# Patient Record
Sex: FEMALE | Race: WHITE | Employment: FULL TIME | URBAN - METROPOLITAN AREA
[De-identification: names, ages, dates, MRNs, and addresses within clinical notes are randomized per-mention and may not be internally consistent; named-entity substitution may affect disease eponyms.]

---

## 2018-11-07 RX ORDER — CEFAZOLIN SODIUM/WATER 2 G/20 ML
2 SYRINGE (ML) INTRAVENOUS ONCE
Status: CANCELLED | OUTPATIENT
Start: 2018-11-07 | End: 2018-11-07

## 2018-11-07 RX ORDER — SODIUM CHLORIDE 0.9 % (FLUSH) 0.9 %
5-10 SYRINGE (ML) INJECTION EVERY 8 HOURS
Status: CANCELLED | OUTPATIENT
Start: 2018-11-07

## 2018-11-07 RX ORDER — SODIUM CHLORIDE 0.9 % (FLUSH) 0.9 %
5-10 SYRINGE (ML) INJECTION AS NEEDED
Status: CANCELLED | OUTPATIENT
Start: 2018-11-07

## 2018-11-08 ENCOUNTER — HOSPITAL ENCOUNTER (OUTPATIENT)
Dept: SURGERY | Age: 48
Discharge: HOME | End: 2018-11-08
Payer: COMMERCIAL

## 2018-11-08 VITALS
DIASTOLIC BLOOD PRESSURE: 80 MMHG | SYSTOLIC BLOOD PRESSURE: 186 MMHG | RESPIRATION RATE: 16 BRPM | HEIGHT: 66 IN | WEIGHT: 293 LBS | TEMPERATURE: 98.3 F | BODY MASS INDEX: 47.09 KG/M2 | HEART RATE: 74 BPM | OXYGEN SATURATION: 93 %

## 2018-11-08 LAB
ALBUMIN SERPL-MCNC: 3.7 G/DL (ref 3.5–5)
ALBUMIN/GLOB SERPL: 1 {RATIO} (ref 1.2–3.5)
ALP SERPL-CCNC: 69 U/L (ref 50–136)
ALT SERPL-CCNC: 29 U/L (ref 12–65)
ANION GAP SERPL CALC-SCNC: 6 MMOL/L (ref 7–16)
AST SERPL-CCNC: 13 U/L (ref 15–37)
BILIRUB SERPL-MCNC: 0.6 MG/DL (ref 0.2–1.1)
BUN SERPL-MCNC: 16 MG/DL (ref 6–23)
CALCIUM SERPL-MCNC: 9 MG/DL (ref 8.3–10.4)
CHLORIDE SERPL-SCNC: 105 MMOL/L (ref 98–107)
CO2 SERPL-SCNC: 29 MMOL/L (ref 21–32)
CREAT SERPL-MCNC: 0.95 MG/DL (ref 0.6–1)
ERYTHROCYTE [DISTWIDTH] IN BLOOD BY AUTOMATED COUNT: 12.9 %
GLOBULIN SER CALC-MCNC: 3.7 G/DL (ref 2.3–3.5)
GLUCOSE SERPL-MCNC: 108 MG/DL (ref 65–100)
HCT VFR BLD AUTO: 51.6 % (ref 35.8–46.3)
HGB BLD-MCNC: 16.7 G/DL (ref 11.7–15.4)
MCH RBC QN AUTO: 31.2 PG (ref 26.1–32.9)
MCHC RBC AUTO-ENTMCNC: 32.4 G/DL (ref 31.4–35)
MCV RBC AUTO: 96.4 FL (ref 79.6–97.8)
NRBC # BLD: 0 K/UL (ref 0–0.2)
PLATELET # BLD AUTO: 221 K/UL (ref 150–450)
PMV BLD AUTO: 11.2 FL (ref 9.4–12.3)
POTASSIUM SERPL-SCNC: 3.9 MMOL/L (ref 3.5–5.1)
PROT SERPL-MCNC: 7.4 G/DL (ref 6.3–8.2)
RBC # BLD AUTO: 5.35 M/UL (ref 4.05–5.2)
SODIUM SERPL-SCNC: 140 MMOL/L (ref 136–145)
WBC # BLD AUTO: 6.6 K/UL (ref 4.3–11.1)

## 2018-11-08 PROCEDURE — 85027 COMPLETE CBC AUTOMATED: CPT

## 2018-11-08 PROCEDURE — 80053 COMPREHEN METABOLIC PANEL: CPT

## 2018-11-08 RX ORDER — LOSARTAN POTASSIUM 100 MG/1
100 TABLET ORAL DAILY
COMMUNITY

## 2018-11-08 NOTE — PERIOP NOTES
Recent Results (from the past 24 hour(s)) CBC W/O DIFF Collection Time: 11/08/18  9:28 AM  
Result Value Ref Range WBC 6.6 4.3 - 11.1 K/uL  
 RBC 5.35 (H) 4.05 - 5.2 M/uL  
 HGB 16.7 (H) 11.7 - 15.4 g/dL HCT 51.6 (H) 35.8 - 46.3 % MCV 96.4 79.6 - 97.8 FL  
 MCH 31.2 26.1 - 32.9 PG  
 MCHC 32.4 31.4 - 35.0 g/dL  
 RDW 12.9 % PLATELET 650 134 - 072 K/uL MPV 11.2 9.4 - 12.3 FL ABSOLUTE NRBC 0.00 0.0 - 0.2 K/uL METABOLIC PANEL, COMPREHENSIVE Collection Time: 11/08/18  9:28 AM  
Result Value Ref Range Sodium 140 136 - 145 mmol/L Potassium 3.9 3.5 - 5.1 mmol/L Chloride 105 98 - 107 mmol/L  
 CO2 29 21 - 32 mmol/L Anion gap 6 (L) 7 - 16 mmol/L Glucose 108 (H) 65 - 100 mg/dL BUN 16 6 - 23 MG/DL Creatinine 0.95 0.6 - 1.0 MG/DL  
 GFR est AA >60 >60 ml/min/1.73m2 GFR est non-AA >60 >60 ml/min/1.73m2 Calcium 9.0 8.3 - 10.4 MG/DL Bilirubin, total 0.6 0.2 - 1.1 MG/DL  
 ALT (SGPT) 29 12 - 65 U/L  
 AST (SGOT) 13 (L) 15 - 37 U/L Alk. phosphatase 69 50 - 136 U/L Protein, total 7.4 6.3 - 8.2 g/dL Albumin 3.7 3.5 - 5.0 g/dL Globulin 3.7 (H) 2.3 - 3.5 g/dL A-G Ratio 1.0 (L) 1.2 - 3.5

## 2018-11-08 NOTE — PERIOP NOTES
Patient confirms name and . Order to obtain consent not found in EHR. Paper orders on chart  matches case posting. Type 1B surgery,  assessment complete. Labs per surgeon: Meera Mojica 18 Labs per anesthesia protocol: none EKG: not required Hibiclens and instructions given per hospital policy. Patient provided with and instructed on educational handouts including Guide to Surgery, Pain Management, Hand Hygiene, Blood Transfusion Education, and [facility]chure. Patient answered medical/surgical history questions at their best of ability. All prior to admission medications documented in [facility]. Patient instructed to continue previous medications as prescribed prior to surgery and to take the following medications the day of surgery according to anesthesia guidelines with a small sip of water: none. Ana Elayne Patient instructed to hold all vitamins 7 days prior to surgery and NSAIDS 5 days prior to surgery, patient verbalized understanding. Medications to be held: none. Patient instructed on the following: 
Arrive at [facility], time of arrival to be called the day before by 1700 NPO after midnight including gum, mints, and ice chips. Responsible adult must drive patient to the hospital, stay during surgery, and patient will require supervision 24 hours after anesthesia. Use hibiclens in shower the night before surgery and on the morning of surgery. Leave all valuables (money and jewelry) at home but bring insurance card and ID on       DOS. Do not wear make-up, nail polish, lotions, cologne, perfumes, powders, or oil on skin. Patient teach back successful and patient demonstrates knowledge of instruction.

## 2018-11-09 ENCOUNTER — ANESTHESIA EVENT (OUTPATIENT)
Dept: SURGERY | Age: 48
End: 2018-11-09
Payer: COMMERCIAL

## 2018-11-09 RX ORDER — CEFAZOLIN SODIUM/WATER 2 G/20 ML
2 SYRINGE (ML) INTRAVENOUS ONCE
Status: CANCELLED | OUTPATIENT
Start: 2018-11-09 | End: 2018-11-09

## 2018-11-09 RX ORDER — SODIUM CHLORIDE 0.9 % (FLUSH) 0.9 %
5-10 SYRINGE (ML) INJECTION AS NEEDED
Status: CANCELLED | OUTPATIENT
Start: 2018-11-09

## 2018-11-09 RX ORDER — SODIUM CHLORIDE 0.9 % (FLUSH) 0.9 %
5-10 SYRINGE (ML) INJECTION EVERY 8 HOURS
Status: CANCELLED | OUTPATIENT
Start: 2018-11-09

## 2018-11-10 ENCOUNTER — ANESTHESIA (OUTPATIENT)
Dept: SURGERY | Age: 48
End: 2018-11-10
Payer: COMMERCIAL

## 2018-11-10 ENCOUNTER — HOSPITAL ENCOUNTER (OUTPATIENT)
Age: 48
Setting detail: OUTPATIENT SURGERY
Discharge: HOME | End: 2018-11-10
Attending: PODIATRIST | Admitting: PODIATRIST
Payer: COMMERCIAL

## 2018-11-10 VITALS
TEMPERATURE: 98.4 F | BODY MASS INDEX: 47.09 KG/M2 | RESPIRATION RATE: 19 BRPM | HEART RATE: 67 BPM | DIASTOLIC BLOOD PRESSURE: 78 MMHG | SYSTOLIC BLOOD PRESSURE: 161 MMHG | HEIGHT: 66 IN | WEIGHT: 293 LBS | OXYGEN SATURATION: 96 %

## 2018-11-10 LAB — GLUCOSE BLD STRIP.AUTO-MCNC: 101 MG/DL (ref 65–100)

## 2018-11-10 PROCEDURE — 76060000033 HC ANESTHESIA 1 TO 1.5 HR: Performed by: PODIATRIST

## 2018-11-10 PROCEDURE — 76010000161 HC OR TIME 1 TO 1.5 HR INTENSV-TIER 1: Performed by: PODIATRIST

## 2018-11-10 PROCEDURE — 74011250636 HC RX REV CODE- 250/636

## 2018-11-10 PROCEDURE — 74011250636 HC RX REV CODE- 250/636: Performed by: PODIATRIST

## 2018-11-10 PROCEDURE — 74011250636 HC RX REV CODE- 250/636: Performed by: ANESTHESIOLOGY

## 2018-11-10 PROCEDURE — 77030012411 HC DRN WND CARD -A: Performed by: PODIATRIST

## 2018-11-10 PROCEDURE — 74011000250 HC RX REV CODE- 250: Performed by: PODIATRIST

## 2018-11-10 PROCEDURE — 82962 GLUCOSE BLOOD TEST: CPT

## 2018-11-10 PROCEDURE — 76210000020 HC REC RM PH II FIRST 0.5 HR: Performed by: PODIATRIST

## 2018-11-10 PROCEDURE — 76210000006 HC OR PH I REC 0.5 TO 1 HR: Performed by: PODIATRIST

## 2018-11-10 PROCEDURE — 77030020782 HC GWN BAIR PAWS FLX 3M -B: Performed by: ANESTHESIOLOGY

## 2018-11-10 PROCEDURE — 77030002916 HC SUT ETHLN J&J -A: Performed by: PODIATRIST

## 2018-11-10 PROCEDURE — 77030013921: Performed by: PODIATRIST

## 2018-11-10 PROCEDURE — 88305 TISSUE EXAM BY PATHOLOGIST: CPT

## 2018-11-10 PROCEDURE — 77030018836 HC SOL IRR NACL ICUM -A: Performed by: PODIATRIST

## 2018-11-10 PROCEDURE — 74011250637 HC RX REV CODE- 250/637: Performed by: ANESTHESIOLOGY

## 2018-11-10 RX ORDER — SODIUM CHLORIDE 0.9 % (FLUSH) 0.9 %
5-10 SYRINGE (ML) INJECTION EVERY 8 HOURS
Status: DISCONTINUED | OUTPATIENT
Start: 2018-11-10 | End: 2018-11-10 | Stop reason: HOSPADM

## 2018-11-10 RX ORDER — FENTANYL CITRATE 50 UG/ML
100 INJECTION, SOLUTION INTRAMUSCULAR; INTRAVENOUS ONCE
Status: DISCONTINUED | OUTPATIENT
Start: 2018-11-10 | End: 2018-11-10 | Stop reason: HOSPADM

## 2018-11-10 RX ORDER — PROPOFOL 10 MG/ML
INJECTION, EMULSION INTRAVENOUS AS NEEDED
Status: DISCONTINUED | OUTPATIENT
Start: 2018-11-10 | End: 2018-11-10 | Stop reason: HOSPADM

## 2018-11-10 RX ORDER — SODIUM CHLORIDE 0.9 % (FLUSH) 0.9 %
5-10 SYRINGE (ML) INJECTION AS NEEDED
Status: DISCONTINUED | OUTPATIENT
Start: 2018-11-10 | End: 2018-11-10 | Stop reason: HOSPADM

## 2018-11-10 RX ORDER — NALOXONE HYDROCHLORIDE 0.4 MG/ML
0.2 INJECTION, SOLUTION INTRAMUSCULAR; INTRAVENOUS; SUBCUTANEOUS AS NEEDED
Status: DISCONTINUED | OUTPATIENT
Start: 2018-11-10 | End: 2018-11-10 | Stop reason: HOSPADM

## 2018-11-10 RX ORDER — SODIUM CHLORIDE, SODIUM LACTATE, POTASSIUM CHLORIDE, CALCIUM CHLORIDE 600; 310; 30; 20 MG/100ML; MG/100ML; MG/100ML; MG/100ML
75 INJECTION, SOLUTION INTRAVENOUS CONTINUOUS
Status: DISCONTINUED | OUTPATIENT
Start: 2018-11-10 | End: 2018-11-10 | Stop reason: HOSPADM

## 2018-11-10 RX ORDER — LIDOCAINE HYDROCHLORIDE 10 MG/ML
INJECTION INFILTRATION; PERINEURAL AS NEEDED
Status: DISCONTINUED | OUTPATIENT
Start: 2018-11-10 | End: 2018-11-10 | Stop reason: HOSPADM

## 2018-11-10 RX ORDER — LIDOCAINE HYDROCHLORIDE 10 MG/ML
0.1 INJECTION INFILTRATION; PERINEURAL AS NEEDED
Status: DISCONTINUED | OUTPATIENT
Start: 2018-11-10 | End: 2018-11-10 | Stop reason: HOSPADM

## 2018-11-10 RX ORDER — SODIUM CHLORIDE, SODIUM LACTATE, POTASSIUM CHLORIDE, CALCIUM CHLORIDE 600; 310; 30; 20 MG/100ML; MG/100ML; MG/100ML; MG/100ML
25 INJECTION, SOLUTION INTRAVENOUS CONTINUOUS
Status: DISCONTINUED | OUTPATIENT
Start: 2018-11-10 | End: 2018-11-10 | Stop reason: HOSPADM

## 2018-11-10 RX ORDER — BUPIVACAINE HYDROCHLORIDE 5 MG/ML
INJECTION, SOLUTION EPIDURAL; INTRACAUDAL AS NEEDED
Status: DISCONTINUED | OUTPATIENT
Start: 2018-11-10 | End: 2018-11-10 | Stop reason: HOSPADM

## 2018-11-10 RX ORDER — FENTANYL CITRATE 50 UG/ML
INJECTION, SOLUTION INTRAMUSCULAR; INTRAVENOUS AS NEEDED
Status: DISCONTINUED | OUTPATIENT
Start: 2018-11-10 | End: 2018-11-10 | Stop reason: HOSPADM

## 2018-11-10 RX ORDER — NALOXONE HYDROCHLORIDE 0.4 MG/ML
0.2 INJECTION, SOLUTION INTRAMUSCULAR; INTRAVENOUS; SUBCUTANEOUS
Status: DISCONTINUED | OUTPATIENT
Start: 2018-11-10 | End: 2018-11-10 | Stop reason: HOSPADM

## 2018-11-10 RX ORDER — MIDAZOLAM HYDROCHLORIDE 1 MG/ML
2 INJECTION, SOLUTION INTRAMUSCULAR; INTRAVENOUS ONCE
Status: DISCONTINUED | OUTPATIENT
Start: 2018-11-10 | End: 2018-11-10 | Stop reason: HOSPADM

## 2018-11-10 RX ORDER — FAMOTIDINE 20 MG/1
20 TABLET, FILM COATED ORAL
Status: COMPLETED | OUTPATIENT
Start: 2018-11-10 | End: 2018-11-10

## 2018-11-10 RX ORDER — MIDAZOLAM HYDROCHLORIDE 1 MG/ML
2 INJECTION, SOLUTION INTRAMUSCULAR; INTRAVENOUS
Status: COMPLETED | OUTPATIENT
Start: 2018-11-10 | End: 2018-11-10

## 2018-11-10 RX ORDER — LIDOCAINE HYDROCHLORIDE 20 MG/ML
INJECTION, SOLUTION EPIDURAL; INFILTRATION; INTRACAUDAL; PERINEURAL AS NEEDED
Status: DISCONTINUED | OUTPATIENT
Start: 2018-11-10 | End: 2018-11-10 | Stop reason: HOSPADM

## 2018-11-10 RX ORDER — MIDAZOLAM HYDROCHLORIDE 1 MG/ML
INJECTION, SOLUTION INTRAMUSCULAR; INTRAVENOUS AS NEEDED
Status: DISCONTINUED | OUTPATIENT
Start: 2018-11-10 | End: 2018-11-10 | Stop reason: HOSPADM

## 2018-11-10 RX ORDER — ONDANSETRON 2 MG/ML
INJECTION INTRAMUSCULAR; INTRAVENOUS AS NEEDED
Status: DISCONTINUED | OUTPATIENT
Start: 2018-11-10 | End: 2018-11-10 | Stop reason: HOSPADM

## 2018-11-10 RX ORDER — HYDROMORPHONE HYDROCHLORIDE 2 MG/ML
0.5 INJECTION, SOLUTION INTRAMUSCULAR; INTRAVENOUS; SUBCUTANEOUS
Status: DISCONTINUED | OUTPATIENT
Start: 2018-11-10 | End: 2018-11-10 | Stop reason: HOSPADM

## 2018-11-10 RX ORDER — ONDANSETRON 2 MG/ML
4 INJECTION INTRAMUSCULAR; INTRAVENOUS
Status: DISCONTINUED | OUTPATIENT
Start: 2018-11-10 | End: 2018-11-10 | Stop reason: HOSPADM

## 2018-11-10 RX ORDER — PROPOFOL 10 MG/ML
INJECTION, EMULSION INTRAVENOUS
Status: DISCONTINUED | OUTPATIENT
Start: 2018-11-10 | End: 2018-11-10 | Stop reason: HOSPADM

## 2018-11-10 RX ORDER — OXYCODONE HYDROCHLORIDE 5 MG/1
5 TABLET ORAL
Status: COMPLETED | OUTPATIENT
Start: 2018-11-10 | End: 2018-11-10

## 2018-11-10 RX ADMIN — MIDAZOLAM HYDROCHLORIDE 0.25 MG: 1 INJECTION, SOLUTION INTRAMUSCULAR; INTRAVENOUS at 08:45

## 2018-11-10 RX ADMIN — PROPOFOL 100 MCG/KG/MIN: 10 INJECTION, EMULSION INTRAVENOUS at 08:01

## 2018-11-10 RX ADMIN — ONDANSETRON 4 MG: 2 INJECTION INTRAMUSCULAR; INTRAVENOUS at 08:43

## 2018-11-10 RX ADMIN — FENTANYL CITRATE 25 MCG: 50 INJECTION, SOLUTION INTRAMUSCULAR; INTRAVENOUS at 08:15

## 2018-11-10 RX ADMIN — MIDAZOLAM HYDROCHLORIDE 1 MG: 1 INJECTION, SOLUTION INTRAMUSCULAR; INTRAVENOUS at 08:01

## 2018-11-10 RX ADMIN — SODIUM CHLORIDE, SODIUM LACTATE, POTASSIUM CHLORIDE, AND CALCIUM CHLORIDE: 600; 310; 30; 20 INJECTION, SOLUTION INTRAVENOUS at 07:55

## 2018-11-10 RX ADMIN — SODIUM CHLORIDE, SODIUM LACTATE, POTASSIUM CHLORIDE, AND CALCIUM CHLORIDE 25 ML/HR: 600; 310; 30; 20 INJECTION, SOLUTION INTRAVENOUS at 06:19

## 2018-11-10 RX ADMIN — MIDAZOLAM HYDROCHLORIDE 0.5 MG: 1 INJECTION, SOLUTION INTRAMUSCULAR; INTRAVENOUS at 08:15

## 2018-11-10 RX ADMIN — PROPOFOL 20 MG: 10 INJECTION, EMULSION INTRAVENOUS at 08:15

## 2018-11-10 RX ADMIN — OXYCODONE HYDROCHLORIDE 5 MG: 5 TABLET ORAL at 09:40

## 2018-11-10 RX ADMIN — FAMOTIDINE 20 MG: 20 TABLET ORAL at 07:13

## 2018-11-10 RX ADMIN — FENTANYL CITRATE 50 MCG: 50 INJECTION, SOLUTION INTRAMUSCULAR; INTRAVENOUS at 08:01

## 2018-11-10 RX ADMIN — SODIUM CHLORIDE, SODIUM LACTATE, POTASSIUM CHLORIDE, AND CALCIUM CHLORIDE: 600; 310; 30; 20 INJECTION, SOLUTION INTRAVENOUS at 08:55

## 2018-11-10 RX ADMIN — Medication 3 G: at 08:05

## 2018-11-10 RX ADMIN — MIDAZOLAM HYDROCHLORIDE 0.25 MG: 1 INJECTION, SOLUTION INTRAMUSCULAR; INTRAVENOUS at 08:37

## 2018-11-10 RX ADMIN — FENTANYL CITRATE 25 MCG: 50 INJECTION, SOLUTION INTRAMUSCULAR; INTRAVENOUS at 08:58

## 2018-11-10 RX ADMIN — LIDOCAINE HYDROCHLORIDE 50 MG: 20 INJECTION, SOLUTION EPIDURAL; INFILTRATION; INTRACAUDAL; PERINEURAL at 08:01

## 2018-11-10 RX ADMIN — PROPOFOL 50 MG: 10 INJECTION, EMULSION INTRAVENOUS at 08:01

## 2018-11-10 RX ADMIN — MIDAZOLAM HYDROCHLORIDE 2 MG: 2 INJECTION, SOLUTION INTRAMUSCULAR; INTRAVENOUS at 07:16

## 2018-11-10 NOTE — ANESTHESIA PREPROCEDURE EVALUATION
Anesthetic History Review of Systems / Medical History Patient summary reviewed and pertinent labs reviewed Pulmonary Sleep apnea: No treatment Smoker (Current daily smoker) Neuro/Psych Psychiatric history (Depression) Cardiovascular Hypertension: well controlled Exercise tolerance: >4 METS Comments: Denies CP, SOB or changes in functional status GI/Hepatic/Renal 
  
 
 
 
 
 
 Endo/Other Diabetes: well controlled, type 2 Morbid obesity Other Findings Physical Exam 
 
Airway Mallampati: II 
TM Distance: 4 - 6 cm Neck ROM: normal range of motion Mouth opening: Normal 
 
 Cardiovascular Rhythm: regular Rate: normal 
 
 
 
 Dental 
No notable dental hx Pulmonary Breath sounds clear to auscultation Abdominal 
GI exam deferred Other Findings Anesthetic Plan ASA: 3 Anesthesia type: total IV anesthesia Induction: Intravenous Anesthetic plan and risks discussed with: Patient and Family

## 2018-11-10 NOTE — BRIEF OP NOTE
BRIEF OPERATIVE NOTE Date of Procedure: 11/10/2018 Preoperative Diagnosis: Ingrown toenail [L60.0] Osteophyte of right foot [M25.774] Osteophyte of left foot [M25.775] Postoperative Diagnosis: Ingrown toenail [L60.0] Osteophyte of right foot [M25.774] Osteophyte of left foot [M25.775] Procedure(s): 
EXOSTECTOMY DISTAL DORSAL PHALANX BILATERAL GREAT TOES 
BILATERAL GREAT TOES PARTIAL MEDIAL AND LATERAL MATRIXECTOMY Surgeon(s) and Role: * Diallo Hernandez DPM - Primary Surgical Assistant: *** Surgical Staff: 
Circ-1: Miroslava Breaux RN Scrub Tech-1: Amber Real Event Time In Time Out Incision Start 5740 Incision Close 0900 Anesthesia: MAC Estimated Blood Loss: *** Specimens:  
ID Type Source Tests Collected by Time Destination 1 : Nail Matrix Preservative Toe  Vera Timmons 11/10/2018 0830 Pathology Findings: *** Complications: *** Implants: * No implants in log *

## 2018-11-10 NOTE — DISCHARGE INSTRUCTIONS
[facility]    Post operative instructions and recommendations for your personal comfort following foot surgery:    1. Upon arrival home, lie down and elevate your feet and legs approximately 16 on pillows. Also, support your knees with pillows. 2. Post-operative swelling is greatly reduced by the use of ice packs. Ice packs should be applied over the top or bottom of the bandage every twenty minutes on the hour until returning to the office. 3. Take medication prescribed by the doctor according to directions. Avoid taking medication on an empty stomach. 4. Remain quiet and off of your feet as much as possible for the first 48 hours. After this period use discretion and common sense regarding the amount of standing or walking you do. Generally, post-operative patients should stay off their feet for 24-48hours. After this period walking to tolerance is usually allowed (unless otherwise specified by your doctor). 5. Do not be alarmed if there is some slight bleeding on the bandages; good blood supply is essential for normal tissue healing. 6. Keep feet elevated as much as possible for the first three days. Generally above the chest is most desirable. 7. Keep bandages completely dry. 8. Do not remove the surgical bandages. 9. The successful result of your surgery depends on the careful following of these instructions. 10. Please refrain from all alcoholic beverages. 11. If there are any questions or problems, please feel free to phone the doctor at the office. Your follow-up appointment has been scheduled for **, 2009 at the [place] office.     Aaron Dumas, [address] [place] [address] [phone]  [address] [facility] [phone]    Dr. Jemma Yoo Phone Number: [phone]    After general anesthesia or intravenous sedation, for 24 hours or while taking prescription Narcotics:  · Limit your activities  · Do not drive and operate hazardous machinery  · Do not make important personal or business decisions  · Do  not drink alcoholic beverages  · If you have not urinated within 8 hours after discharge, please contact your surgeon on call. *  Please give a list of your current medications to your Primary Care Provider. *  Please update this list whenever your medications are discontinued, doses are      changed, or new medications (including over-the-counter products) are added. *  Please carry medication information at all times in case of emergency situations. These are general instructions for a healthy lifestyle:  No smoking/ No tobacco products/ Avoid exposure to second hand smoke  Surgeon General's Warning:  Quitting smoking now greatly reduces serious risk to your health. Obesity, smoking, and sedentary lifestyle greatly increases your risk for illness  A healthy diet, regular physical exercise & weight monitoring are important for maintaining a healthy lifestyle    You may be retaining fluid if you have a history of heart failure or if you experience any of the following symptoms:  Weight gain of 3 pounds or more overnight or 5 pounds in a week, increased swelling in our hands or feet or shortness of breath while lying flat in bed. Please call your doctor as soon as you notice any of these symptoms; do not wait until your next office visit. Recognize signs and symptoms of STROKE:  F-face looks uneven  A-arms unable to move or move unevenly  S-speech slurred or non-existent  T-time-call 911 as soon as signs and symptoms begin-DO NOT go       Back to bed or wait to see if you get better-TIME IS BRAIN.

## 2018-11-10 NOTE — H&P
Patient: Zulema Gusman MRN: [id number]  SSN: [id number] YOB: 1970  Age: 50 y.o. Sex: female History and Physical 
 
Debbie Palumbo is a 50 y.o. female having Procedure(s): 
EXOSTECTOMY DISTAL DORSAL PHALANX BILATERAL GREAT TOES 
BILATERAL GREAT TOES PARTIAL MEDIAL AND LATERAL MATRIXECTOMY. Allergies: Allergies Allergen Reactions  Contrast Dye [Iodine] Anaphylaxis  Lisinopril-Hydrochlorothiazide Other (comments) \"right leg went numb\" Marie Clubs [Benzocaine-Phenol] Other (comments) Burns to roof of mouth Chief Complaint: Foot pain History of Present Illness: 51 yo F presenting for TIVA to facilitate bilateral foot surgery. Past Medical History:  
Diagnosis Date  Diabetes ([address] borderline  Hypertension  Ill-defined condition   
 borderline high cholesterol  Ill-defined condition   
 benighn tumor -adrenal gland  Psychiatric disorder   
 depression  Sleep apnea   
 does not wear cpap Past Surgical History:  
Procedure Laterality Date  HX  SECTION    
 HX CHOLECYSTECTOMY   [address] [facility] left knee  HX OTHER SURGICAL  2007  
 left adrenal gland-leep Family History Problem Relation Age of Onset  Diabetes Father  Hypertension Father  No Known Problems Brother Social History Tobacco Use  Smoking status: Current Every Day Smoker Packs/day: 1.00  Smokeless tobacco: Never Used Substance Use Topics  Alcohol use: No  
  Frequency: Never Prior to Admission medications Medication Sig Start Date End Date Taking? Authorizing Provider  
losartan (COZAAR) 100 mg tablet Take 100 mg by mouth daily. Yes Provider, Historical  
acetaminophen/diphenhydramine (TYLENOL PM PO) Take  by mouth. Yes Provider, Historical  
  
 
Visit Vitals /86 (BP 1 Location: Right arm, BP Patient Position: Supine) Pulse 76 Temp 36.7 °C (98 °F) Resp 18 Ht 5' 6\" (1.676 m) Wt 137.6 kg (303 lb 4 oz) SpO2 97% BMI 48.95 kg/m² Assessment and Plan:  
Gage Fuller is a 50 y.o. female having Procedure(s): 
EXOSTECTOMY DISTAL DORSAL PHALANX BILATERAL GREAT TOES 
BILATERAL GREAT TOES PARTIAL MEDIAL AND LATERAL MATRIXECTOMY for TIVA. Preanesthesia Evaluation Last edited 11/10/18 9944 by Tammi Gottlieb MD 
Date of Service 11/10/18 8880 Anesthetic History Review of Systems / Medical History Patient summary reviewed and pertinent labs reviewed Pulmonary Sleep apnea: No treatment Smoker (Current daily smoker) Neuro/Psych Psychiatric history (Depression) Cardiovascular Hypertension: well controlled Exercise tolerance: >4 METS Comments: Denies CP, SOB or changes in functional status GI/Hepatic/Renal 
  
 
 
 
 
 
 Endo/Other Diabetes: well controlled, type 2 Morbid obesity Other Findings Physical Exam 
 
Airway Mallampati: II 
TM Distance: 4 - 6 cm Neck ROM: normal range of motion Mouth opening: Normal 
 
 Cardiovascular Rhythm: regular Rate: normal 
 
 
 
 Dental 
No notable dental hx Pulmonary Breath sounds clear to auscultation Abdominal 
GI exam deferred Other Findings Anesthetic Plan ASA: 3 Anesthesia type: total IV anesthesia Induction: Intravenous Anesthetic plan and risks discussed with: Patient and Family Preanesthesia evaluation performed by Tammi Gottlieb MD  
  
 
 
 
Signed By: Cheryl Arango MD   
 November 10, 2018

## 2018-11-10 NOTE — ANESTHESIA POSTPROCEDURE EVALUATION
Procedure(s): 
EXOSTECTOMY DISTAL DORSAL PHALANX BILATERAL GREAT TOES 
BILATERAL GREAT TOES PARTIAL MEDIAL AND LATERAL MATRIXECTOMY. Anesthesia Post Evaluation Multimodal analgesia: multimodal analgesia used between 6 hours prior to anesthesia start to PACU discharge Patient location during evaluation: bedside Patient participation: complete - patient participated Level of consciousness: awake and alert Pain score: 1 Pain management: adequate Airway patency: patent Anesthetic complications: no 
Cardiovascular status: acceptable Respiratory status: acceptable Hydration status: acceptable Comments: Pt doing well. Ok to d/c home. Visit Vitals BP (!) 170/93 (BP 1 Location: Right arm, BP Patient Position: At rest) Pulse 80 Temp 36.3 °C (97.4 °F) Resp 16 Ht 5' 6\" (1.676 m) Wt 137.6 kg (303 lb 4 oz) SpO2 97% BMI 48.95 kg/m²

## 2018-11-21 NOTE — OP NOTES
[facility] REPORT    Lexy Ford  MR#: [id number]  : 1970  ACCOUNT #: [de-identified]   DATE OF SERVICE: 11/10/2018    PREOPERATIVE DIAGNOSES:  1.  Osteophyte in distal dorsal tip of the distal phalanx of the bilateral halluces. 2.  Ingrown medial and lateral borders of bilateral halluces. POSTOPERATIVE DIAGNOSES:    1.  Osteophyte in distal dorsal tip of the distal phalanx of the bilateral halluces. 2.  Ingrown medial and lateral borders of bilateral halluces. ANESTHESIA:  IV sedation with local infiltration. PROCEDURES PERFORMED:  1. Exostectomy of the distal dorsal phalanx of the distal tips of the distal phalanx of bilateral halluces. 2.  Partial medial and lateral matricectomies of the bilateral halluces nails. SURGEON:  Andrew Rose DPM    ASSISTANT:  None     ESTIMATED BLOOD LOSS:  minimal    SPECIMENS REMOVED:  Sent for path    COMPLICATIONS:  none    IMPLANTS:  none    PROCEDURE IN DETAIL:  The patient was brought to the operating room and placed on operating table in supine position. After brief general anesthesia, local anesthesia was achieved utilizing 1:1 mixture of 1% Xylocaine and 0.5% Marcaine, utilizing 6 mL for each toe. At this time, both of the feet were then prepped and draped in the usual standard fashion. At this time, attention was directed to the right hallux where the nail was noted to be quite incurvated and raised distally. A transverse incision of approximately 1 cm was placed on the tip of the hallux approximately 1 cm or so from the plantar surface. The incision was carried down to the bony layer via sharp and dull dissection. At this time, the distal tip was freed from the surrounding soft tissues. Utilizing a power rasp and a hand rasp, the entire bony prominence was osteotomized and rasped smooth. The surgical site was irrigated with copious amount of sterile saline.   Visualization and palpation revealed satisfactory correction. At this time, the wound was once again irrigated with copious amount of sterile saline and the wound was closed with 4-0 nylon with multiple simple interrupted sutures. Procedure #2 was performed in a similar fashion as procedure #1 on the left hallux. PROCEDURE #3:  Attention was then directed to the right hallux. Partial medial and lateral surgical matricectomies were performed by removing the portions of the nails and surgically excising the medial and lateral matrices. No complications were noted. The wounds were irrigated with copious amount of sterile saline. Both borders were closed utilizing 4-0 nylon with multiple simple interrupted sutures. A similar procedure was also performed on the left hallux. No complications were noted. The patient tolerated the procedure. The wounds were wrapped. The tourniquets that were used were at the base of the hallux. As soon as these were finished the Penrose drain was removed and instant warming and pinking of the digits were noted indicating good return of the vascular supply. The patient tolerated anesthesia and procedures well and left the operating room in apparent satisfactory condition. The nail matrices have been sent for pathological examination. Postoperatively, she has been given written postoperative instructions to follow. She has been provided with numbers to call including my cell number if any questions or concerns arise. She has been given prescriptions for Tylenol 3, #30, one every 4-6 hours as needed for foot pain; Augmentin 500 mg, #30, one p.o. b.i.d. Her prognosis remains guarded. She is to come and see me in approximately 5-7 days for followup.       JAMI Montez  D: 11/21/2018 09:58     T: 11/21/2018 11:06  JOB #: [id number]  CC: Sophie Headley DPM

## 2019-04-15 PROBLEM — E66.01 MORBID OBESITY (HCC): Status: ACTIVE | Noted: 2019-04-15

## 2019-04-15 PROBLEM — N28.89 RENAL MASS: Status: ACTIVE | Noted: 2019-04-15

## 2019-04-15 PROBLEM — R10.30 LOWER ABDOMINAL PAIN: Status: ACTIVE | Noted: 2019-04-15

## 2019-04-15 PROBLEM — Z72.0 TOBACCO ABUSE: Status: ACTIVE | Noted: 2019-04-15

## 2019-04-15 PROBLEM — K43.9 VENTRAL HERNIA: Status: ACTIVE | Noted: 2019-04-15

## 2019-04-23 ENCOUNTER — HOSPITAL ENCOUNTER (OUTPATIENT)
Dept: CT IMAGING | Age: 49
Discharge: HOME | End: 2019-04-23
Attending: SURGERY
Payer: COMMERCIAL

## 2019-04-23 DIAGNOSIS — K43.9 VENTRAL HERNIA WITHOUT OBSTRUCTION OR GANGRENE: ICD-10-CM

## 2019-04-23 DIAGNOSIS — R10.30 LOWER ABDOMINAL PAIN: ICD-10-CM

## 2019-04-23 DIAGNOSIS — D49.519 RENAL NEOPLASM: ICD-10-CM

## 2019-04-23 PROCEDURE — 74176 CT ABD & PELVIS W/O CONTRAST: CPT

## 2019-06-11 RX ORDER — SODIUM CHLORIDE 0.9 % (FLUSH) 0.9 %
5-40 SYRINGE (ML) INJECTION AS NEEDED
Status: CANCELLED | OUTPATIENT
Start: 2019-06-11

## 2019-06-11 RX ORDER — SODIUM CHLORIDE 0.9 % (FLUSH) 0.9 %
5-40 SYRINGE (ML) INJECTION EVERY 8 HOURS
Status: CANCELLED | OUTPATIENT
Start: 2019-06-11

## 2021-01-03 ENCOUNTER — HOSPITAL ENCOUNTER (EMERGENCY)
Age: 51
Discharge: HOME | End: 2021-01-03
Attending: EMERGENCY MEDICINE
Payer: COMMERCIAL

## 2021-01-03 VITALS
DIASTOLIC BLOOD PRESSURE: 80 MMHG | OXYGEN SATURATION: 98 % | RESPIRATION RATE: 20 BRPM | SYSTOLIC BLOOD PRESSURE: 166 MMHG | HEART RATE: 86 BPM | TEMPERATURE: 98.2 F

## 2021-01-03 DIAGNOSIS — I10 HYPERTENSION, UNSPECIFIED TYPE: Primary | ICD-10-CM

## 2021-01-03 LAB
ALBUMIN SERPL-MCNC: 3.8 G/DL (ref 3.5–5)
ALBUMIN/GLOB SERPL: 1 {RATIO} (ref 1.2–3.5)
ALP SERPL-CCNC: 90 U/L (ref 50–136)
ALT SERPL-CCNC: 21 U/L (ref 12–65)
ANION GAP SERPL CALC-SCNC: 5 MMOL/L (ref 7–16)
AST SERPL-CCNC: 5 U/L (ref 15–37)
BASOPHILS # BLD: 0 K/UL (ref 0–0.2)
BASOPHILS NFR BLD: 1 % (ref 0–2)
BILIRUB SERPL-MCNC: 0.6 MG/DL (ref 0.2–1.1)
BUN SERPL-MCNC: 15 MG/DL (ref 6–23)
CALCIUM SERPL-MCNC: 9 MG/DL (ref 8.3–10.4)
CHLORIDE SERPL-SCNC: 104 MMOL/L (ref 98–107)
CO2 SERPL-SCNC: 29 MMOL/L (ref 21–32)
CREAT SERPL-MCNC: 0.85 MG/DL (ref 0.6–1)
DIFFERENTIAL METHOD BLD: ABNORMAL
EOSINOPHIL # BLD: 0.1 K/UL (ref 0–0.8)
EOSINOPHIL NFR BLD: 2 % (ref 0.5–7.8)
ERYTHROCYTE [DISTWIDTH] IN BLOOD BY AUTOMATED COUNT: 12.8 % (ref 11.9–14.6)
GLOBULIN SER CALC-MCNC: 3.8 G/DL (ref 2.3–3.5)
GLUCOSE SERPL-MCNC: 103 MG/DL (ref 65–100)
HCT VFR BLD AUTO: 51.8 % (ref 35.8–46.3)
HGB BLD-MCNC: 17.3 G/DL (ref 11.7–15.4)
IMM GRANULOCYTES # BLD AUTO: 0 K/UL (ref 0–0.5)
IMM GRANULOCYTES NFR BLD AUTO: 0 % (ref 0–5)
LYMPHOCYTES # BLD: 1.8 K/UL (ref 0.5–4.6)
LYMPHOCYTES NFR BLD: 26 % (ref 13–44)
MCH RBC QN AUTO: 31.5 PG (ref 26.1–32.9)
MCHC RBC AUTO-ENTMCNC: 33.4 G/DL (ref 31.4–35)
MCV RBC AUTO: 94.4 FL (ref 79.6–97.8)
MONOCYTES # BLD: 0.7 K/UL (ref 0.1–1.3)
MONOCYTES NFR BLD: 10 % (ref 4–12)
NEUTS SEG # BLD: 4.2 K/UL (ref 1.7–8.2)
NEUTS SEG NFR BLD: 62 % (ref 43–78)
NRBC # BLD: 0 K/UL (ref 0–0.2)
PLATELET # BLD AUTO: 180 K/UL (ref 150–450)
PMV BLD AUTO: 11.1 FL (ref 9.4–12.3)
POTASSIUM SERPL-SCNC: 4 MMOL/L (ref 3.5–5.1)
PROT SERPL-MCNC: 7.6 G/DL (ref 6.3–8.2)
RBC # BLD AUTO: 5.49 M/UL (ref 4.05–5.2)
SODIUM SERPL-SCNC: 138 MMOL/L (ref 136–145)
WBC # BLD AUTO: 6.8 K/UL (ref 4.3–11.1)

## 2021-01-03 PROCEDURE — 85025 COMPLETE CBC W/AUTO DIFF WBC: CPT

## 2021-01-03 PROCEDURE — 96374 THER/PROPH/DIAG INJ IV PUSH: CPT

## 2021-01-03 PROCEDURE — 99285 EMERGENCY DEPT VISIT HI MDM: CPT

## 2021-01-03 PROCEDURE — 93005 ELECTROCARDIOGRAM TRACING: CPT | Performed by: EMERGENCY MEDICINE

## 2021-01-03 PROCEDURE — 80053 COMPREHEN METABOLIC PANEL: CPT

## 2021-01-03 PROCEDURE — 74011000250 HC RX REV CODE- 250

## 2021-01-03 RX ORDER — AMLODIPINE BESYLATE 5 MG/1
5 TABLET ORAL DAILY
Qty: 30 TAB | Refills: 0 | Status: SHIPPED | OUTPATIENT
Start: 2021-01-03 | End: 2021-02-02

## 2021-01-03 RX ORDER — LABETALOL HYDROCHLORIDE 5 MG/ML
INJECTION, SOLUTION INTRAVENOUS
Status: COMPLETED
Start: 2021-01-03 | End: 2021-01-03

## 2021-01-03 RX ORDER — LABETALOL HYDROCHLORIDE 5 MG/ML
20 INJECTION, SOLUTION INTRAVENOUS ONCE
Status: COMPLETED | OUTPATIENT
Start: 2021-01-03 | End: 2021-01-03

## 2021-01-03 RX ADMIN — LABETALOL HYDROCHLORIDE 20 MG: 5 INJECTION INTRAVENOUS at 17:46

## 2021-01-03 RX ADMIN — LABETALOL HYDROCHLORIDE 20 MG: 5 INJECTION, SOLUTION INTRAVENOUS at 17:46

## 2021-01-03 NOTE — ED PROVIDER NOTES
55-year-old lady with a history of high blood pressure presents with concerns about an elevated blood pressure. [facility] because her job required her to get a Covid test due to the fact that her son tested positive for Covid. Patient says that while she was there she had a rapid test which was negative but they noticed that her blood pressure was in the 220 range. She says that she has not been on any antihypertensives in about 9 months. She says she previously was taking lisinopril/HCTZ but she had some right leg symptoms. Then switched to regular lisinopril but still had some unacceptable side effects in her leg. She was never restarted on any other medicine. She said that her primary care doctor has seen her a couple of times since then but has not restarted any medicines. Patient said that normally her blood pressure is in the 170-180 range. She says she otherwise feels fine and has no headache, chest pain, difficulty breathing, back pain, or abdominal pain. Elements of this note were created using speech recognition software. As such, errors of speech recognition may be present.            Past Medical History:   Diagnosis Date    [address] [address]     borderline    Hypertension     Ill-defined condition     borderline high cholesterol    Ill-defined condition     benighn tumor -adrenal gland    Psychiatric disorder     depression    Sleep apnea     does not wear cpap       Past Surgical History:   Procedure Laterality Date    HX  SECTION      HX CHOLECYSTECTOMY  2004    HX HYSTERECTOMY      HX ORTHOPAEDIC  1984    left knee    HX OTHER SURGICAL  2007    left adrenal gland-leep         Family History:   Problem Relation Age of Onset    Diabetes Father     Hypertension Father     No Known Problems Brother        Social History     Socioeconomic History    Marital status:      Spouse name: Not on file    Number of children: Not on file    Years of education: Not on file    Highest education level: Not on file   Occupational History    Not on file   Social Needs    Financial resource strain: Not on file    Food insecurity     Worry: Not on file     Inability: Not on file    Transportation needs     Medical: Not on file     Non-medical: Not on file   Tobacco Use    Smoking status: Current Every Day Smoker     Packs/day: 1.00    Smokeless tobacco: Never Used   Substance and Sexual Activity    Alcohol use: No     Frequency: Never    Drug use: Yes     Types: Marijuana     Comment: occasional    Sexual activity: Not Currently   Lifestyle    Physical activity     Days per week: Not on file     Minutes per session: Not on file    Stress: Not on file   Relationships    Social connections     Talks on phone: Not on file     Gets together: Not on file     Attends [employer] service: Not on file     Active member of club or organization: Not on file     Attends meetings of clubs or organizations: Not on file     Relationship status: Not on file    Intimate partner violence     Fear of current or ex partner: Not on file     Emotionally abused: Not on file     Physically abused: Not on file     Forced sexual activity: Not on file   Other Topics Concern    Not on file   Social History Narrative    Not on file         ALLERGIES: Contrast dye [iodine], Lisinopril-hydrochlorothiazide, and Orarelief [benzocaine-phenol]    Review of Systems   Constitutional: Negative for chills, diaphoresis and fever. HENT: Negative for congestion, rhinorrhea and sore throat. Eyes: Negative for redness and visual disturbance. Respiratory: Negative for cough, chest tightness, shortness of breath and wheezing. Cardiovascular: Negative for chest pain and palpitations. Gastrointestinal: Negative for abdominal pain, blood in stool, diarrhea, nausea and vomiting. Endocrine: Negative for polydipsia and polyuria.    Genitourinary: Negative for dysuria and hematuria. Musculoskeletal: Negative for arthralgias, myalgias and neck stiffness. Skin: Negative for rash. Allergic/Immunologic: Negative for environmental allergies and food allergies. Neurological: Negative for dizziness, weakness and headaches. Hematological: Negative for adenopathy. Does not bruise/bleed easily. Psychiatric/Behavioral: Negative for confusion and sleep disturbance. The patient is not nervous/anxious. Vitals:    01/03/21 1704   BP: (!) 209/119   Pulse: 86   Resp: 20   Temp: 98.2 °F (36.8 °C)   SpO2: 97%            Physical Exam  Vitals signs and nursing note reviewed. Constitutional:       General: She is not in acute distress. Appearance: She is well-developed. She is not toxic-appearing. HENT:      Head: Normocephalic and atraumatic. Eyes:      General: No scleral icterus. Right eye: No discharge. Left eye: No discharge. Conjunctiva/sclera: Conjunctivae normal.      Pupils: Pupils are equal, round, and reactive to light. Neck:      Musculoskeletal: Normal range of motion. No neck rigidity. Cardiovascular:      Rate and Rhythm: Normal rate and regular rhythm. Heart sounds: Normal heart sounds. Pulmonary:      Effort: Pulmonary effort is normal. No respiratory distress. Breath sounds: Normal breath sounds. No wheezing or rales. Chest:      Chest wall: No tenderness. Abdominal:      General: Bowel sounds are normal. There is no distension. Palpations: Abdomen is soft. Tenderness: There is no guarding or rebound. Musculoskeletal: Normal range of motion. General: No tenderness. Lymphadenopathy:      Cervical: No cervical adenopathy. Skin:     General: Skin is warm and dry. Neurological:      General: No focal deficit present. Mental Status: She is alert and oriented to person, place, and time.    Psychiatric:         Mood and Affect: Mood normal.         Behavior: Behavior normal. MDM  Number of Diagnoses or Management Options  Diagnosis management comments: Patient's blood pressure is elevated. With a proper size cuff it was 247/125. I will give her an IV dose of labetalol and see how she responds. ED Course as of Jan 03 1839   Ani Red Jan 03, 2021 1813 Patient's EKG is consistent with a rhythm strip in lead II from 2018. I do not think she has nothing on there that represents acute ischemic changes. [AC]   5610 Patient's pressure has trended down. I do not think she needs admission to the hospital.  She remains asymptomatic.   I will discharge her home with a prescription for Norvasc.    [AC]      ED Course User Index  [AC] David Reddy MD       Procedures

## 2021-01-03 NOTE — ED TRIAGE NOTES
Patient arrives via EMS from [address] Rapid COVID test negative at [facility] clinic today. States her son is COVID positive and her work required her to get tested. Hx of HTN and her PCP took her off of her medication 9 months ago because it makes her legs go numb. Hypertensive for EMS, otherwise asymptomatic.

## 2021-01-03 NOTE — DISCHARGE INSTRUCTIONS
Return with any fevers, vomiting, chest pain, headaches, worsening symptoms, or additional concerns. Follow-up with your primary care doctor for reevaluation.

## 2021-01-04 LAB
ATRIAL RATE: 77 BPM
CALCULATED P AXIS, ECG09: 70 DEGREES
CALCULATED R AXIS, ECG10: -53 DEGREES
CALCULATED T AXIS, ECG11: 88 DEGREES
DIAGNOSIS, 93000: NORMAL
P-R INTERVAL, ECG05: 176 MS
Q-T INTERVAL, ECG07: 386 MS
QRS DURATION, ECG06: 84 MS
QTC CALCULATION (BEZET), ECG08: 436 MS
VENTRICULAR RATE, ECG03: 77 BPM

## 2022-03-18 PROBLEM — E66.01 MORBID OBESITY (HCC): Status: ACTIVE | Noted: 2019-04-15

## 2022-03-19 PROBLEM — N28.89 RENAL MASS: Status: ACTIVE | Noted: 2019-04-15

## 2022-03-19 PROBLEM — R10.30 LOWER ABDOMINAL PAIN: Status: ACTIVE | Noted: 2019-04-15

## 2022-03-19 PROBLEM — Z72.0 TOBACCO ABUSE: Status: ACTIVE | Noted: 2019-04-15

## 2022-03-20 PROBLEM — K43.9 VENTRAL HERNIA: Status: ACTIVE | Noted: 2019-04-15

## 2022-08-25 ENCOUNTER — OFFICE VISIT (OUTPATIENT)
Dept: ORTHOPEDIC SURGERY | Age: 52
End: 2022-08-25
Payer: COMMERCIAL

## 2022-08-25 VITALS — HEIGHT: 66 IN | BODY MASS INDEX: 47.09 KG/M2 | WEIGHT: 293 LBS

## 2022-08-25 DIAGNOSIS — M17.11 PRIMARY OSTEOARTHRITIS OF RIGHT KNEE: ICD-10-CM

## 2022-08-25 DIAGNOSIS — M25.561 RIGHT KNEE PAIN, UNSPECIFIED CHRONICITY: Primary | ICD-10-CM

## 2022-08-25 PROCEDURE — 20610 DRAIN/INJ JOINT/BURSA W/O US: CPT | Performed by: ORTHOPAEDIC SURGERY

## 2022-08-25 RX ORDER — METHYLPREDNISOLONE ACETATE 40 MG/ML
40 INJECTION, SUSPENSION INTRA-ARTICULAR; INTRALESIONAL; INTRAMUSCULAR; SOFT TISSUE ONCE
Status: COMPLETED | OUTPATIENT
Start: 2022-08-25 | End: 2022-08-25

## 2022-08-25 RX ORDER — MELOXICAM 15 MG/1
TABLET ORAL
COMMUNITY
Start: 2022-08-05

## 2022-08-25 RX ORDER — AMLODIPINE BESYLATE 10 MG/1
TABLET ORAL
COMMUNITY
Start: 2022-08-03

## 2022-08-25 RX ADMIN — METHYLPREDNISOLONE ACETATE 80 MG: 40 INJECTION, SUSPENSION INTRA-ARTICULAR; INTRALESIONAL; INTRAMUSCULAR; SOFT TISSUE at 11:39

## 2022-08-25 NOTE — PROGRESS NOTES
Name: Maira Anderson  YOB: 1970  Gender: female  MRN: [id number]    CC: Right knee pain    HPI:   04/11/2022: She woke up with Left knee pain and inability to weight-bear  04/13/2022: [facility] urgent care  04/21/2022: I diagnosed left tricompartmental knee arthritis: Injected left knee: Good results  04/21/2022: I prescribed a lateral column offloading brace from [facility] but the brace irritated her leg  08/22/2022: She reports twisting her right knee and feeling a pop.  08/25/2022: She presents today to assess her knee pain    ROS/Meds/PSH/PMH/FH/SH: reviewed today    Tobacco:  reports that she has been smoking cigarettes. She has been smoking an average of 1 pack per day. She has never used smokeless tobacco.     Physical Examination:  Patient appears to be alert and oriented with acceptable appearance. No obvious distress or SOB  CV: appears to have acceptable vascular color and capillary refill  Neuro: appears to have mostly intact light touch sensation   Skin: Mild very soft knee effusion  MS: Standing: Plantigrade: Gait a little protected right  Right = anterior medial knee pain; acceptable knee motion; no gross loss of motor; no instability    XR: Right side: AP lateral knee plus notch and sunrise views with tricompartmental knee arthritis; significant advanced arthritic changes  XR Impression:  As above      Reviewed Test/Records/Documents: Prior notes and PT notes reviewed. She did well on her left side with 2 cc Xylocaine and 80 mg Depo-Medrol left knee injection    Injection: We discussed the risks and complications of the procedure and the decision was made to proceed; the right knee joint was injected with 2 cc Xylocaine: 80 mg Depo-Medrol; she appeared to do well:     Assessment:    Right acute knee pain with chronic tricompartmental knee arthritis  Left tricompartmental knee arthritis    Plan:   The patient and I discussed the above assessment. We explored treatment options.      I believe she more likely has a degenerative meniscal tear  Hopefully after injection today, she will get the same relief she did on her left side  She needs to proceed with total joint consultation  Consultation: TKA partner  Advanced medical imaging: No indication today for MRI scan or CT scan    She has tried non-custom and custom bracing   She understands need protection  PT: She understands PT as she has done it in the past    Medication - OTC meds prn: She was unable to obtain in the past the medication from [employer] [facility]    Surgical discussion: Future surgery: TKA  Follow up: TKA partner  Work status: Out of work until 08/29/2022  History and discussion of management with: Patient    This note was created using Dragon voice recognition software which may result in errors of speech and spelling recognition and word/phrase syntax errors.

## 2022-08-25 NOTE — LETTER
[address],[facility] [address]  Phone: [phone]  Fax: [phone]    Sushil Gomez MD        August 25, 2022     Patient: Natalie Palafox   YOB: 1970   Date of Visit: 8/25/2022       To Whom It May Concern: It is my medical opinion that [employer]:  Work Status: Out of work until 08-    If you have any questions or concerns, please don't hesitate to call.     Sincerely,        Sushil Gomez MD

## 2022-08-31 ENCOUNTER — TELEPHONE (OUTPATIENT)
Dept: ORTHOPEDIC SURGERY | Age: 52
End: 2022-08-31

## 2022-08-31 NOTE — TELEPHONE ENCOUNTER
[facility], sent to [facility] for payment and completion

## 2022-09-02 ENCOUNTER — TELEPHONE (OUTPATIENT)
Dept: ORTHOPEDIC SURGERY | Age: 52
End: 2022-09-02

## 2022-09-14 ENCOUNTER — OFFICE VISIT (OUTPATIENT)
Dept: ORTHOPEDIC SURGERY | Age: 52
End: 2022-09-14
Payer: COMMERCIAL

## 2022-09-14 ENCOUNTER — TELEPHONE (OUTPATIENT)
Dept: ORTHOPEDIC SURGERY | Age: 52
End: 2022-09-14

## 2022-09-14 VITALS — BODY MASS INDEX: 47.09 KG/M2 | WEIGHT: 293 LBS | HEIGHT: 66 IN

## 2022-09-14 DIAGNOSIS — M17.11 PRIMARY OSTEOARTHRITIS OF RIGHT KNEE: Primary | ICD-10-CM

## 2022-09-14 DIAGNOSIS — M17.12 PRIMARY OSTEOARTHRITIS OF LEFT KNEE: ICD-10-CM

## 2022-09-14 PROCEDURE — 99214 OFFICE O/P EST MOD 30 MIN: CPT | Performed by: ORTHOPAEDIC SURGERY

## 2022-09-14 NOTE — PROGRESS NOTES
Name: Carlene Sparks  YOB: 1970  Gender: female  MRN: [id number]      Chief complaint:  BILATERAL knee pain    History of Present Illness:     Carlene Sparks is a 46 y.o. female  The pain has been present for about a year. .  Overall the left knee bothers her more than the right. She is referred to us by Dr. Otilia Ibarra. She states she did have cortisone injection performed to both knees during her last visit with Dr. Otilia Ibarra and these helped quite a bit. They state the pain is located anterior, medial, and lateral.  The patient describes the quality of the pain as a deep ache. The symptoms are exacerbated by weight bearing, walking and standing for long periods of time. The pain is also exacerbated by ascending and descending stairs, getting up and down from a chair. Previous treatment includes anti-inflammatories, use of cane/walker and activity modification. These treatment options have not helped. She does have a history of a left knee surgery, what sounds to be an open arthrotomy, when she was 15years old. She states this was for her patella not tracking appropriately on the left knee. She does have a history of chronic low back pain. Past Medical History: Allergies: Allergies   Allergen Reactions    Iodine Anaphylaxis, Hives, Itching and Swelling    Other Anaphylaxis and Itching     CT contrast dye  Other reaction(s):  Other (comments)  Burns to roof of mouth      Benzocaine Other (See Comments)     Burns to roof of mouth  Burns to roof of mouth      Iodinated Diagnostic Agents Hives, Itching and Swelling    Lisinopril-Hydrochlorothiazide Other (See Comments)     \"right leg went numb\"       Medications:  Current Outpatient Medications   Medication Sig    amLODIPine (NORVASC) 10 MG tablet TAKE 1 TABLET BY MOUTH ONCE DAILY    meloxicam (MOBIC) 15 MG tablet TAKE 1 TABLET BY MOUTH ONCE DAILY    diphenhydrAMINE-APAP, sleep, (TYLENOL PM EXTRA STRENGTH)  MG tablet Take by mouth    ibuprofen (ADVIL;MOTRIN) 800 MG tablet TAKE 1 TABLET BY MOUTH THREE TIMES DAILY AS NEEDED FOR PAIN FOR 10 DAYS    losartan (COZAAR) 100 MG tablet Take 100 mg by mouth daily     No current facility-administered medications for this visit. Past Medical history:  Past Medical History:   Diagnosis Date    Diabetes ([address]     borderline    Hypertension     Ill-defined condition     benighn tumor -adrenal gland    Ill-defined condition     borderline high cholesterol    Psychiatric disorder     depression    Sleep apnea     does not wear cpap        has a past surgical history that includes Hysterectomy (); orthopedic surgery (); other surgical history ();  section (); and Cholecystectomy (). Family History:  [unfilled]     Social History:  [unfilled]    Review of Systems:       Physical Exam:    General:   Alert and oriented    Gait:          Normal gait    Back:        diffuse tenderness over lower back. BILATERAL Hip:    Skin is intact. There is pain with palpation over the greater trochanter. No groin pain and restricted ROM of the hip     BILATERAL Knee: Skin: normal, anterior scar left knee                    Effusion: no                    Tenderness to palpation: none                    Patella grind test: no                    Stability:  Valgus: yes Varus: yes AP: yes                    Tameka's test: negative                    Quad Strength: good                    ROM   Extension: 0 Flexion: 110                    Popliteal cyst : no                    Calf pain : no                    Edema left leg: none noted    Neurovascular:  Patient has good pulses distally. They have intact motor and sensory function. X-rays of the BILATERAL knee:  AP, lateral, merchant and Skiers shows reviewed from xrays performed on 22 and 22 shows moderate to severe valgus left knee DJD and moderate varus right knee DJD.     Diagnoses: Severe left knee DJD, moderate right knee DJD in a super morbidly obese female    Plan:    Discussed the patient about her radiographic findings and treatment options going forward. She does have advanced DJD of both knees, left slightly worse than right. She is young and at some point will need discuss surgical intervention, which would be a total knee arthroplasty. Unfortunate with her weight and body habitus with a BMI over 51 she is not a good surgical candidate. This was discussed with her and she voiced understanding of this. Weight loss was discussed with her and this would be prudent in order to help facilitate any possibility of surgery in the future. She would need to lose a least 75 pounds for her surgery to be a realistic possibility. Bariatric information was given to her today. We also discussed other conservative measures to help with her knee pain. This included periodic cortisone injections, viscosupplementation, the use of oral NSAIDs and the use of a cane/walker if needed. We we will proceed with viscosupplementation for both knees pending insurance approval.  She was in agreement with this plan we will otherwise see her back for viscosupplementation in the next 2 to 3 weeks.       Linn Runner, PA

## 2022-09-20 ENCOUNTER — TELEPHONE (OUTPATIENT)
Dept: ORTHOPEDIC SURGERY | Age: 52
End: 2022-09-20

## 2022-09-20 NOTE — TELEPHONE ENCOUNTER
Left patient a VM that the injections are not covered by her insurance so I am going to cancel the appointments that are scheduled. If she chooses to do self pay for the injections she can call back to get them rescheduled.

## 2024-05-30 ENCOUNTER — TELEPHONE (OUTPATIENT)
Dept: ORTHOPEDIC SURGERY | Age: 54
End: 2024-05-30

## 2024-05-30 ENCOUNTER — OFFICE VISIT (OUTPATIENT)
Dept: ORTHOPEDIC SURGERY | Age: 54
End: 2024-05-30

## 2024-05-30 VITALS — BODY MASS INDEX: 47.09 KG/M2 | HEIGHT: 66 IN | WEIGHT: 293 LBS

## 2024-05-30 DIAGNOSIS — M19.072 PRIMARY OSTEOARTHRITIS OF LEFT FOOT: ICD-10-CM

## 2024-05-30 DIAGNOSIS — M79.672 LEFT FOOT PAIN: Primary | ICD-10-CM

## 2024-05-30 RX ORDER — METHYLPREDNISOLONE ACETATE 40 MG/ML
40 INJECTION, SUSPENSION INTRA-ARTICULAR; INTRALESIONAL; INTRAMUSCULAR; SOFT TISSUE ONCE
Status: COMPLETED | OUTPATIENT
Start: 2024-05-30 | End: 2024-05-30

## 2024-05-30 RX ORDER — METFORMIN HYDROCHLORIDE 500 MG/1
TABLET, EXTENDED RELEASE ORAL
COMMUNITY
Start: 2024-05-08

## 2024-05-30 RX ADMIN — METHYLPREDNISOLONE ACETATE 80 MG: 40 INJECTION, SUSPENSION INTRA-ARTICULAR; INTRALESIONAL; INTRAMUSCULAR; SOFT TISSUE at 09:34

## 2024-05-30 NOTE — TELEPHONE ENCOUNTER
[facility] is calling about the cream that was sent in. They can't do that at [facility]. Please send somewhere else.

## 2024-05-30 NOTE — TELEPHONE ENCOUNTER
Called and spoke with [facility] to call in rx for Gabapentin 10% cream with 2 refills.       Called and spoke with pt. Pt is aware [facility] would not fill and rx was called into [facility]. Pt voiced understanding.

## 2024-05-30 NOTE — PROGRESS NOTES
Name: Ximena Ramirez  YOB: 1970  Gender: female  MRN: [id number]    CC: Left foot pain    HPI:   05/30/2024: Initial visit: Left dorsal midfoot pain: Pain can radiate proximal and distal    ROS/Meds/PSH/PMH/FH/SH: reviewed today    Tobacco:  reports that she has been smoking cigarettes. She has never used smokeless tobacco.     Physical Examination:  Patient appears to be alert and oriented with acceptable appearance.  No obvious distress or SOB  CV: appears to have acceptable vascular color and capillary refill  Neuro: appears to have mostly intact light touch sensation   Skin: Left = dorsal midfoot TMT soft tissue thickening  MS: Standing: Pes planus: Gait:  Left = 2-4 TMT pain most noticeable 3rd TMT  Left = no ankle or forefoot pain  Left = full ankle/foot motion; 5/5 strength    XR: Left: Standing AP lateral mortise ankle plus AP oblique foot taken today with plantar/posterior heel enthesopathy; dorsal talonavicular exostosis; 2-4 tarsometatarsal arthritis  XR Impression:  As above      Reviewed Test/Records/Documents:   03/31/2021: Dr. Fernandez: Left medial malleolar avulsion fracture:  04/11/2022: Woke up with acute left knee pain and inability to weight-bear:  04/21/2022: Diagnosis: Left tricompartmental knee arthritis: Left knee injection:  08/25/2022: Right acute knee pain: Chronic tricompartmental knee arthritis: Knee injection  09/14/2022:  Darin Harriett, PA: Bilateral knee osteoarthritis: Discussed viscoelastic injections    Timeout: I confirmed with the patient and Ashlyn: All are in agreement with: Correct patient, planned procedure, procedure site and laterality: Left 2-3 tarsometatarsal joints  We discussed her benzocaine listed allergy: She reports that it was only burning to the roof of her mouth with the mouthwash  She has had prior knee injections with Xylocaine with no deleterious effect  Elected today to just use Depo-Medrol and no Xylocaine  Injection: We discussed

## 2024-10-16 ENCOUNTER — TELEPHONE (OUTPATIENT)
Dept: ORTHOPEDIC SURGERY | Age: 54
End: 2024-10-16

## 2024-10-16 NOTE — TELEPHONE ENCOUNTER
Called pt to see how she was feeling and to ask if she'd like to reschedule her missed apt. Pt stated she cancelled the apt on Mon. Pt does not need apt and will call if ever needed.

## 2025-05-21 ENCOUNTER — TELEPHONE (OUTPATIENT)
Dept: ORTHOPEDIC SURGERY | Age: 55
End: 2025-05-21

## 2025-05-21 NOTE — TELEPHONE ENCOUNTER
Spoke with Ximena Ramirez to inform them that their appointment for 6/5/25 has been changed to 6/4/25. This is due to a clinic schedule change within the practice.       MJ

## 2025-06-04 ENCOUNTER — OFFICE VISIT (OUTPATIENT)
Dept: ORTHOPEDIC SURGERY | Age: 55
End: 2025-06-04

## 2025-06-04 VITALS — BODY MASS INDEX: 47.09 KG/M2 | HEIGHT: 66 IN | WEIGHT: 293 LBS

## 2025-06-04 DIAGNOSIS — M25.511 RIGHT SHOULDER PAIN, UNSPECIFIED CHRONICITY: Primary | ICD-10-CM

## 2025-06-04 PROBLEM — I10 ESSENTIAL HYPERTENSION: Status: ACTIVE | Noted: 2018-09-10

## 2025-06-04 RX ORDER — TRIAMCINOLONE ACETONIDE 40 MG/ML
40 INJECTION, SUSPENSION INTRA-ARTICULAR; INTRAMUSCULAR ONCE
Status: COMPLETED | OUTPATIENT
Start: 2025-06-04 | End: 2025-06-04

## 2025-06-04 RX ADMIN — TRIAMCINOLONE ACETONIDE 40 MG: 40 INJECTION, SUSPENSION INTRA-ARTICULAR; INTRAMUSCULAR at 14:47

## 2025-06-04 NOTE — PROGRESS NOTES
Name: Ximena Ramirez  YOB: 1970  Gender: female  MRN: [id number]    CC: Shoulder Pain (R)     HPI: Ximena Ramirez is a 54 y.o. female who presents with Shoulder Pain (R)    History of Present Illness  The patient presents for evaluation of right shoulder pain.    She reports experiencing pain in her right shoulder for the past 3 weeks, with no known injury. The pain is described as mild when at rest but intensifies during certain movements, particularly when raising her arm. She does not experience any associated weakness or difficulty in grasping objects. The pain is localized to the shoulder and does not radiate into her hand. However, she has noticed recent onset of pain extending into her neck, jawline, and collarbone. She has attempted to alleviate the shoulder pain with heat and ice, but these measures have been ineffective. She is currently on meloxicam, prescribed by a physician at this facility a few years ago for ankle and knee issues, and therefore does not take ibuprofen.     She is a new patient coming in with right shoulder pain for about 3 weeks. She did not have an injury and she does not do a lot of lifting due to a doctor accommodation letter. She feels catching with abduction.      ROS/Meds/PSH/PMH/FH/SH: I personally reviewed the patients standard intake form.  Below are the pertinents    Tobacco:  reports that she has been smoking cigarettes. She has never used smokeless tobacco.  Diabetes: none  Other: Hypertension, obesity    Physical Examination:  General: no acute distress  Lungs: breathing easily  CV: regular rhythm by pulse  Right Shoulder: Full shoulder range of motion present.  She does have pain with active abduction but no pain with passive abduction.  5 out of 5 rotator cuff strength in all planes.  Positive impingement signs.  Negative Rosamond's.  Negative speeds.  No tenderness along the AC joint.  Motor or sensory intact distally.  Distal radial pulse

## (undated) DEVICE — LARGE TEAR CROSS CUT RASP (14.0 X 7.0MM)

## (undated) DEVICE — SOLUTION IV 1000ML 0.9% SOD CHL

## (undated) DEVICE — STOCKINETTE TUBE 6X48 -- MEDICHOICE

## (undated) DEVICE — CURITY NON-ADHERENT STRIPS: Brand: CURITY

## (undated) DEVICE — BANDAGE COMPR 9 FTX4 IN SMOOTH COMFORTABLE SYNTH ESMRK LF

## (undated) DEVICE — (D)STRIP SKN CLSR 0.5X4IN WHT --

## (undated) DEVICE — SKIN MARKER FINE TIP: Brand: DEVON

## (undated) DEVICE — DRAPE, EXTREMITY, BILATERAL, STERILE: Brand: MEDLINE

## (undated) DEVICE — Device

## (undated) DEVICE — PADDING CAST W4INXL4YD ST COT COHESIVE HND TEARABLE SPEC

## (undated) DEVICE — BUTTON SWITCH PENCIL BLADE ELECTRODE, HOLSTER: Brand: EDGE

## (undated) DEVICE — REM POLYHESIVE ADULT PATIENT RETURN ELECTRODE: Brand: VALLEYLAB

## (undated) DEVICE — DRAPE TWL SURG 16X26IN BLU ORB04] ALLCARE INC]

## (undated) DEVICE — PENROSE DRAIN 12" X 1/4: Brand: CARDINAL HEALTH

## (undated) DEVICE — SUT ETHLN 4-0 18IN PS2 BLK --

## (undated) DEVICE — BANDAGE COMPR SELF ADH 5 YDX4 IN TAN STRL PREMIERPRO LF